# Patient Record
Sex: MALE | Race: WHITE | NOT HISPANIC OR LATINO
[De-identification: names, ages, dates, MRNs, and addresses within clinical notes are randomized per-mention and may not be internally consistent; named-entity substitution may affect disease eponyms.]

---

## 2020-07-22 ENCOUNTER — TRANSCRIPTION ENCOUNTER (OUTPATIENT)
Age: 30
End: 2020-07-22

## 2020-08-17 ENCOUNTER — OUTPATIENT (OUTPATIENT)
Dept: OUTPATIENT SERVICES | Facility: HOSPITAL | Age: 30
LOS: 1 days | Discharge: HOME | End: 2020-08-17

## 2020-08-17 VITALS
DIASTOLIC BLOOD PRESSURE: 64 MMHG | RESPIRATION RATE: 16 BRPM | SYSTOLIC BLOOD PRESSURE: 111 MMHG | TEMPERATURE: 96 F | OXYGEN SATURATION: 99 % | HEIGHT: 71 IN | WEIGHT: 145.51 LBS | HEART RATE: 47 BPM

## 2020-08-17 DIAGNOSIS — Z11.59 ENCOUNTER FOR SCREENING FOR OTHER VIRAL DISEASES: ICD-10-CM

## 2020-08-17 DIAGNOSIS — S63.641D SPRAIN OF METACARPOPHALANGEAL JOINT OF RIGHT THUMB, SUBSEQUENT ENCOUNTER: ICD-10-CM

## 2020-08-17 DIAGNOSIS — S63.641A SPRAIN OF METACARPOPHALANGEAL JOINT OF RIGHT THUMB, INITIAL ENCOUNTER: ICD-10-CM

## 2020-08-17 DIAGNOSIS — Z01.818 ENCOUNTER FOR OTHER PREPROCEDURAL EXAMINATION: ICD-10-CM

## 2020-08-17 LAB
ALBUMIN SERPL ELPH-MCNC: 4.9 G/DL — SIGNIFICANT CHANGE UP (ref 3.5–5.2)
ALP SERPL-CCNC: 41 U/L — SIGNIFICANT CHANGE UP (ref 30–115)
ALT FLD-CCNC: 22 U/L — SIGNIFICANT CHANGE UP (ref 0–41)
ANION GAP SERPL CALC-SCNC: 11 MMOL/L — SIGNIFICANT CHANGE UP (ref 7–14)
APTT BLD: 29.3 SEC — SIGNIFICANT CHANGE UP (ref 27–39.2)
AST SERPL-CCNC: 25 U/L — SIGNIFICANT CHANGE UP (ref 0–41)
BASOPHILS # BLD AUTO: 0.04 K/UL — SIGNIFICANT CHANGE UP (ref 0–0.2)
BASOPHILS NFR BLD AUTO: 0.6 % — SIGNIFICANT CHANGE UP (ref 0–1)
BILIRUB SERPL-MCNC: 0.6 MG/DL — SIGNIFICANT CHANGE UP (ref 0.2–1.2)
BUN SERPL-MCNC: 18 MG/DL — SIGNIFICANT CHANGE UP (ref 10–20)
CALCIUM SERPL-MCNC: 9.9 MG/DL — SIGNIFICANT CHANGE UP (ref 8.5–10.1)
CHLORIDE SERPL-SCNC: 100 MMOL/L — SIGNIFICANT CHANGE UP (ref 98–110)
CO2 SERPL-SCNC: 30 MMOL/L — SIGNIFICANT CHANGE UP (ref 17–32)
CREAT SERPL-MCNC: 1 MG/DL — SIGNIFICANT CHANGE UP (ref 0.7–1.5)
EOSINOPHIL # BLD AUTO: 0.02 K/UL — SIGNIFICANT CHANGE UP (ref 0–0.7)
EOSINOPHIL NFR BLD AUTO: 0.3 % — SIGNIFICANT CHANGE UP (ref 0–8)
GLUCOSE SERPL-MCNC: 115 MG/DL — HIGH (ref 70–99)
HCT VFR BLD CALC: 41.4 % — LOW (ref 42–52)
HGB BLD-MCNC: 13.7 G/DL — LOW (ref 14–18)
IMM GRANULOCYTES NFR BLD AUTO: 0.2 % — SIGNIFICANT CHANGE UP (ref 0.1–0.3)
INR BLD: 0.96 RATIO — SIGNIFICANT CHANGE UP (ref 0.65–1.3)
LYMPHOCYTES # BLD AUTO: 2.72 K/UL — SIGNIFICANT CHANGE UP (ref 1.2–3.4)
LYMPHOCYTES # BLD AUTO: 42.7 % — SIGNIFICANT CHANGE UP (ref 20.5–51.1)
MCHC RBC-ENTMCNC: 30 PG — SIGNIFICANT CHANGE UP (ref 27–31)
MCHC RBC-ENTMCNC: 33.1 G/DL — SIGNIFICANT CHANGE UP (ref 32–37)
MCV RBC AUTO: 90.6 FL — SIGNIFICANT CHANGE UP (ref 80–94)
MONOCYTES # BLD AUTO: 0.71 K/UL — HIGH (ref 0.1–0.6)
MONOCYTES NFR BLD AUTO: 11.1 % — HIGH (ref 1.7–9.3)
NEUTROPHILS # BLD AUTO: 2.87 K/UL — SIGNIFICANT CHANGE UP (ref 1.4–6.5)
NEUTROPHILS NFR BLD AUTO: 45.1 % — SIGNIFICANT CHANGE UP (ref 42.2–75.2)
NRBC # BLD: 0 /100 WBCS — SIGNIFICANT CHANGE UP (ref 0–0)
PLATELET # BLD AUTO: 205 K/UL — SIGNIFICANT CHANGE UP (ref 130–400)
POTASSIUM SERPL-MCNC: 4.5 MMOL/L — SIGNIFICANT CHANGE UP (ref 3.5–5)
POTASSIUM SERPL-SCNC: 4.5 MMOL/L — SIGNIFICANT CHANGE UP (ref 3.5–5)
PROT SERPL-MCNC: 6.9 G/DL — SIGNIFICANT CHANGE UP (ref 6–8)
PROTHROM AB SERPL-ACNC: 11 SEC — SIGNIFICANT CHANGE UP (ref 9.95–12.87)
RBC # BLD: 4.57 M/UL — LOW (ref 4.7–6.1)
RBC # FLD: 12.3 % — SIGNIFICANT CHANGE UP (ref 11.5–14.5)
SODIUM SERPL-SCNC: 141 MMOL/L — SIGNIFICANT CHANGE UP (ref 135–146)
WBC # BLD: 6.37 K/UL — SIGNIFICANT CHANGE UP (ref 4.8–10.8)
WBC # FLD AUTO: 6.37 K/UL — SIGNIFICANT CHANGE UP (ref 4.8–10.8)

## 2020-08-17 NOTE — H&P PST ADULT - HISTORY OF PRESENT ILLNESS
Pt states he dislocated right thumb and it causes pain whenever he uses his right hand. Denies any chest pain, difficulty breathing, SOB, palpitations, dysuria, URI, or any other infections in the last 2 weeks. Denies any recent travel, contact, or exposure to any persons with known or suspected COVID-19. Pt also denies COVID testing within the last 2 weeks. Pt advised to self quarantine until day of procedure. Exercise tolerance of 2-3 flights of stairs without dyspnea. STORM reviewed with patient.     Anesthesia Alert  NO--Difficult Airway  NO--History of neck surgery or radiation  NO--Limited ROM of neck  NO--History of Malignant hyperthermia  NO--No personal or family history of Pseudocholinesterase deficiency.  NO--Prior Anesthesia Complication  NO--Latex Allergy  NO--Loose teeth  NO--History of Rheumatoid Arthritis  NO--STORM  NO--Other_____

## 2020-08-17 NOTE — H&P PST ADULT - NSANTHOSAYNRD_GEN_A_CORE
No. STORM screening performed.  STOP BANG Legend: 0-2 = LOW Risk; 3-4 = INTERMEDIATE Risk; 5-8 = HIGH Risk

## 2020-08-17 NOTE — H&P PST ADULT - EXTREMITIES COMMENTS
Brace on right upper extremity. Right thumb limited ROM and decrease strength, capillary refill less then 3 seconds

## 2020-08-20 ENCOUNTER — OUTPATIENT (OUTPATIENT)
Dept: OUTPATIENT SERVICES | Facility: HOSPITAL | Age: 30
LOS: 1 days | Discharge: HOME | End: 2020-08-20

## 2020-08-20 VITALS
DIASTOLIC BLOOD PRESSURE: 82 MMHG | SYSTOLIC BLOOD PRESSURE: 131 MMHG | HEART RATE: 55 BPM | OXYGEN SATURATION: 100 % | RESPIRATION RATE: 22 BRPM

## 2020-08-20 VITALS
OXYGEN SATURATION: 100 % | DIASTOLIC BLOOD PRESSURE: 87 MMHG | HEIGHT: 71 IN | HEART RATE: 52 BPM | RESPIRATION RATE: 18 BRPM | SYSTOLIC BLOOD PRESSURE: 125 MMHG | WEIGHT: 145.51 LBS | TEMPERATURE: 99 F

## 2020-08-20 RX ORDER — ONDANSETRON 8 MG/1
4 TABLET, FILM COATED ORAL ONCE
Refills: 0 | Status: DISCONTINUED | OUTPATIENT
Start: 2020-08-20 | End: 2020-09-04

## 2020-08-20 RX ORDER — OXYCODONE HYDROCHLORIDE 5 MG/1
5 TABLET ORAL ONCE
Refills: 0 | Status: DISCONTINUED | OUTPATIENT
Start: 2020-08-20 | End: 2020-08-20

## 2020-08-20 RX ORDER — HYDROMORPHONE HYDROCHLORIDE 2 MG/ML
0.5 INJECTION INTRAMUSCULAR; INTRAVENOUS; SUBCUTANEOUS
Refills: 0 | Status: DISCONTINUED | OUTPATIENT
Start: 2020-08-20 | End: 2020-08-20

## 2020-08-20 RX ORDER — SODIUM CHLORIDE 9 MG/ML
1000 INJECTION, SOLUTION INTRAVENOUS
Refills: 0 | Status: DISCONTINUED | OUTPATIENT
Start: 2020-08-20 | End: 2020-09-04

## 2020-08-20 RX ADMIN — SODIUM CHLORIDE 100 MILLILITER(S): 9 INJECTION, SOLUTION INTRAVENOUS at 08:39

## 2020-08-20 NOTE — CHART NOTE - NSCHARTNOTEFT_GEN_A_CORE
PACU ANESTHESIA ADMISSION NOTE      Procedure: Repair, ligament, thumb: right thumb rcl mpj    Post op diagnosis:  Rupture of radial collateral ligament of right thumb      ____  Intubated  TV:______       Rate: ______      FiO2: ______    _x___  Patent Airway    _x___  Full return of protective reflexes    _x___  Full recovery from anesthesia / back to baseline status    Vitals:  T(C): 37.1  HR: 52  BP: 125/87  RR: 18  SpO2: 100%    Mental Status:  _x___ Awake   _____ Alert   _____ Drowsy   _____ Sedated    Nausea/Vomiting:  _x___  NO       ______Yes,   See Post - Op Orders         Pain Scale (0-10):  __0___    Treatment: _x___ None    ____ See Post - Op/PCA Orders    Post - Operative Fluids:   ____ Oral   __x__ See Post - Op Orders    Plan: Discharge:   _x___Home       _____Floor     _____Critical Care    _____  Other:_________________    Comments:  No anesthesia issues or complications noted.  Discharge when criteria met.

## 2020-08-20 NOTE — BRIEF OPERATIVE NOTE - NSICDXBRIEFPOSTOP_GEN_ALL_CORE_FT
POST-OP DIAGNOSIS:  Rupture of radial collateral ligament of right thumb 20-Aug-2020 08:39:57  Reina Ruiz

## 2020-08-20 NOTE — BRIEF OPERATIVE NOTE - NSICDXBRIEFPREOP_GEN_ALL_CORE_FT
PRE-OP DIAGNOSIS:  Rupture of radial collateral ligament of right thumb 20-Aug-2020 08:39:53  Reina Ruiz

## 2020-08-25 DIAGNOSIS — S63.601A UNSPECIFIED SPRAIN OF RIGHT THUMB, INITIAL ENCOUNTER: ICD-10-CM

## 2020-08-25 DIAGNOSIS — F17.220 NICOTINE DEPENDENCE, CHEWING TOBACCO, UNCOMPLICATED: ICD-10-CM
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
## 2020-08-26 DIAGNOSIS — Z20.828 CONTACT WITH AND (SUSPECTED) EXPOSURE TO OTHER VIRAL COMMUNICABLE DISEASES: ICD-10-CM

## 2021-10-11 ENCOUNTER — OUTPATIENT (OUTPATIENT)
Dept: OUTPATIENT SERVICES | Facility: HOSPITAL | Age: 31
LOS: 1 days | Discharge: HOME | End: 2021-10-11

## 2021-10-11 ENCOUNTER — LABORATORY RESULT (OUTPATIENT)
Age: 31
End: 2021-10-11

## 2021-10-11 DIAGNOSIS — Z11.59 ENCOUNTER FOR SCREENING FOR OTHER VIRAL DISEASES: ICD-10-CM

## 2021-10-11 PROBLEM — Z78.9 OTHER SPECIFIED HEALTH STATUS: Chronic | Status: ACTIVE | Noted: 2020-08-17

## 2021-10-14 PROBLEM — Z00.00 ENCOUNTER FOR PREVENTIVE HEALTH EXAMINATION: Status: ACTIVE | Noted: 2021-10-14

## 2021-12-30 NOTE — ASU PATIENT PROFILE, ADULT - PSH
children: Not on file    Years of education: Not on file    Highest education level: Not on file   Occupational History    Not on file   Tobacco Use    Smoking status: Passive Smoke Exposure - Never Smoker    Smokeless tobacco: Never Used   Substance and Sexual Activity    Alcohol use: No    Drug use: No    Sexual activity: Not Currently   Other Topics Concern    Not on file   Social History Narrative    Not on file     Social Determinants of Health     Financial Resource Strain:     Difficulty of Paying Living Expenses: Not on file   Food Insecurity:     Worried About Running Out of Food in the Last Year: Not on file    Major of Food in the Last Year: Not on file   Transportation Needs:     Lack of Transportation (Medical): Not on file    Lack of Transportation (Non-Medical): Not on file   Physical Activity:     Days of Exercise per Week: Not on file    Minutes of Exercise per Session: Not on file   Stress:     Feeling of Stress : Not on file   Social Connections:     Frequency of Communication with Friends and Family: Not on file    Frequency of Social Gatherings with Friends and Family: Not on file    Attends [employer] Services: Not on file    Active Member of [address] [place] [address] or Organizations: Not on file    Attends Club or Organization Meetings: Not on file    Marital Status: Not on file   Intimate Partner Violence:     Fear of Current or Ex-Partner: Not on file    Emotionally Abused: Not on file    Physically Abused: Not on file    Sexually Abused: Not on file   Housing Stability:     Unable to Pay for Housing in the Last Year: Not on file    Number of [place] in the Last Year: Not on file    Unstable Housing in the Last Year: Not on file       SCREENINGS           PHYSICAL EXAM    (up to 7 for level 4, 8 or more for level 5)     ED Triage Vitals   BP Temp Temp src Pulse Resp SpO2 Height Weight   -- -- -- -- -- -- -- --       Physical Exam    General: Alert and awake ×3.   Nontoxic appearance. Well-developed well-nourished 82-year-old male in no acute distress  HEENT: Normocephalic atraumatic. Neck is supple. Airway intact. No adenopathy  Cardiac: Regular rate and rhythm with no murmurs rubs or gallops  Pulmonary: Lungs are clear in all lung fields. No wheezing. No Rales. Abdomen: Soft and nontender. Negative hepatosplenomegaly. Bowel sounds are active  Extremities: Moving all extremities. No calf tenderness. Peripheral pulses all intact  Skin: No skin lesions. No rashes  Neurologic: Cranial nerves II through XII was grossly intact. Nonfocal neurological exam  Psychiatric: Patient is pleasant. Mood is appropriate. DIAGNOSTIC RESULTS     EKG (Per Emergency Physician):       RADIOLOGY (Per Emergency Physician): Interpretation per the Radiologist below, if available at the time of this note:  No results found. ED BEDSIDE ULTRASOUND:   Performed by ED Physician - none    LABS:  Labs Reviewed   [id number]   COVID-19   COVID-19   COVID-19        All other labs were within normal range or not returned as of this dictation. Procedures      EMERGENCY DEPARTMENT COURSE and DIFFERENTIAL DIAGNOSIS/MDM:   Vitals:    Vitals:    12/29/21 2219   BP: (!) 145/89   Pulse: 72   Resp: 16   Temp: 98.1 °F (36.7 °C)   TempSrc: Oral   SpO2: 98%   Weight: 256 lb 6.3 oz (116.3 kg)   Height: 5' 9\" (1.753 m)       Medications - No data to display    MDM. Patient 82-year-old with exposure to Covid for couple days now developing symptoms. Covid test obtained PCR pending. Patient discharged supportive care only follow-up. Isolation precautions taken. REVAL:         CRITICAL CARE TIME   Total CriticalCare time was 0 minutes, excluding separately reportable procedures. There was a high probability of clinically significant/life threatening deterioration in the patient's condition which required my urgent intervention.      CONSULTS:  None    PROCEDURES:  Unless otherwise noted below, none     [unfilled]    FINAL IMPRESSION      1. COVID-19 virus infection          DISPOSITION/PLAN   DISPOSITION Decision To Discharge 12/29/2021 10:42:00 PM      PATIENT REFERRED TO:  Family physician    Schedule an appointment as soon as possible for a visit in 1 week  If symptoms worsen      DISCHARGE MEDICATIONS:  New Prescriptions    No medications on file          (Please note:  Portions of this note were completed with a voice recognition program.Efforts were made to edit the dictations but occasionally words and phrases are mis-transcribed.)  Form v2016. [id number]    Elroy CALZADA MD (electronically signed)  Emergency Medicine Provider        April Nyhan, MD  12/29/21 0026 No significant past surgical history

## 2023-12-09 ENCOUNTER — EMERGENCY (EMERGENCY)
Facility: HOSPITAL | Age: 33
LOS: 0 days | Discharge: HOME | End: 2023-12-09
Attending: EMERGENCY MEDICINE
Payer: COMMERCIAL

## 2023-12-09 VITALS
TEMPERATURE: 98 F | WEIGHT: 149.91 LBS | RESPIRATION RATE: 18 BRPM | SYSTOLIC BLOOD PRESSURE: 141 MMHG | OXYGEN SATURATION: 95 % | HEIGHT: 71 IN | DIASTOLIC BLOOD PRESSURE: 84 MMHG | HEART RATE: 65 BPM

## 2023-12-09 DIAGNOSIS — S43.005A UNSPECIFIED DISLOCATION OF LEFT SHOULDER JOINT, INITIAL ENCOUNTER: ICD-10-CM

## 2023-12-09 DIAGNOSIS — M25.512 PAIN IN LEFT SHOULDER: ICD-10-CM
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
## 2023-12-09 PROCEDURE — 99284 EMERGENCY DEPT VISIT MOD MDM: CPT | Mod: 57

## 2023-12-09 PROCEDURE — 73030 X-RAY EXAM OF SHOULDER: CPT | Mod: LT

## 2023-12-09 PROCEDURE — 99283 EMERGENCY DEPT VISIT LOW MDM: CPT | Mod: 25

## 2023-12-09 PROCEDURE — 23650 CLTX SHO DSLC W/MNPJ WO ANES: CPT | Mod: 54,LT

## 2023-12-09 PROCEDURE — 73030 X-RAY EXAM OF SHOULDER: CPT | Mod: 26,LT

## 2023-12-09 PROCEDURE — 23650 CLTX SHO DSLC W/MNPJ WO ANES: CPT | Mod: LT

## 2023-12-09 NOTE — ED PROVIDER NOTE - NSFOLLOWUPINSTRUCTIONS_ED_ALL_ED_FT
Our Emergency Department Referral Coordinators will be reaching out to you in the next 24-48 hours from 9:00am to 5:00pm with a follow up appointment. Please expect a phone call from the hospital in that time frame. If you do not receive a call or if you have any questions or concerns, you can reach them at   [phone]   ---------------------------------      Closed Reduction    WHAT YOU NEED TO KNOW:    Closed reduction is a procedure to put the pieces of a broken bone back into the right place without surgery. Closed reduction is used when your bone is broken in one place and the bone pieces have not gone through the skin. It is also used when you do not need hardware such as pins, screws, or plates to hold the pieces of bone in place. It is best if closed reduction can be done as soon as possible after your bone is broken.    DISCHARGE INSTRUCTIONS:    Call your local emergency number (911 in the ) if:    You feel lightheaded, short of breath, and have chest pain.    You cough up blood.  Call your doctor if:    Your arm or leg feels warm, tender, and painful. It may look swollen and red.    You have severe pain, even after you take medicine.    Your injured limb is red or swollen.    You have numbness in your fingers or toes.    You have a fever or chills.    There is a foul smell coming from inside your splint or cast.    Your splint or cast gets damaged, wet, or cracks.    Your splint or cast feels too tight or too loose.    You have questions or concerns about your condition or care.  Medicines: You may need any of the following:    Acetaminophen decreases pain and fever. It is available without a doctor's order. Ask how much to take and how often to take it. Follow directions. Read the labels of all other medicines you are using to see if they also contain acetaminophen, or ask your doctor or pharmacist. Acetaminophen can cause liver damage if not taken correctly.    NSAIDs, such as ibuprofen, help decrease swelling, pain, and fever. This medicine is available with or without a doctor's order. NSAIDs can cause stomach bleeding or kidney problems in certain people. If you take blood thinner medicine, always ask if NSAIDs are safe for you. Always read the medicine label and follow directions. Do not give these medicines to children younger than 6 months without direction from a healthcare provider.    Prescription pain medicine may be given. Ask your healthcare provider how to take this medicine safely. Some prescription pain medicines contain acetaminophen. Do not take other medicines that contain acetaminophen without talking to your healthcare provider. Too much acetaminophen may cause liver damage. Prescription pain medicine may cause constipation. Ask your healthcare provider how to prevent or treat constipation.    Take your medicine as directed. Contact your healthcare provider if you think your medicine is not helping or if you have side effects. Tell your provider if you are allergic to any medicine. Keep a list of the medicines, vitamins, and herbs you take. Include the amounts, and when and why you take them. Bring the list or the pill bottles to follow-up visits. Carry your medicine list with you in case of an emergency.  Care for yourself at home:    Use crutches or a walker as directed if you broke a bone in your leg or foot. These devices will help you walk and take some weight off your injured leg or foot.    Rest your limb as much as possible. Ask your healthcare provider when you can return to daily activities. You will need to limit activities or exercise while your bone heals.    Elevate your injured area above the level of your heart as often as you can. This will help decrease swelling and pain. Prop your cast or splint on pillows or blankets to keep it elevated comfortably.    Apply ice on your broken bone for 15 to 20 minutes every hour or as directed. Use an ice pack, or put crushed ice in a plastic bag. Cover it with a towel. Ice helps prevent tissue damage and decreases swelling and pain.    Do not take baths, soak in a hot tub, or go swimming until your provider says it is okay.    Go to physical therapy if directed. Physical therapy usually starts after your bones have healed and your splint or cast is removed. A physical therapist teaches you exercises to help improve movement and strength.    Do not wear rings on your fingers if the break was in an arm or hand. Don't wear rings on your toes if the break was in the leg or foot.  Cast and splint care:    Check your cast every day. Call your healthcare provider if you notice any cracks, dents, holes, or flaking on your cast.    Keep your splint or cast clean and dry. Cover your splint or cast with a towel when you eat. If your splint or cast gets dirty, use a mild detergent and a damp washcloth to wipe off the outside. Continue to cover your splint or cast with trash bags to keep it dry while you bathe.    Care for the edges of your cast. Cover the cast edges to keep them smooth. Use 4 inch pieces of waterproof tape. Place one end of the tape under the inside edge of your cast and fold it over to the outside surface. Overlap tape strips until the edges are completely covered. Change the tape as directed. Do not pull or repair any of the padding from inside the cast. This could cause blisters and sores on the skin under your cast.    Keep weight off your splint or cast. Do not let anyone push down or lean on your splint or cast. This may cause it to break.    Do not put sharp objects in the splint or cast. Do not use a sharp or pointed object to scratch under your splint or cast. This may cause wounds that can get infected, or you may lose the item inside. If your skin itches, blow cool air into the splint or cast. You may also gently scratch your skin outside with a cloth.  Follow up with your healthcare provider as directed: You may need to return for x-rays or to have your splint changed to a cast. Write down your questions so you remember to ask them during your visits. Our Emergency Department Referral Coordinators will be reaching out to you in the next 24-48 hours from 9:00am to 5:00pm with a follow up appointment. Please expect a phone call from the hospital in that time frame. If you do not receive a call or if you have any questions or concerns, you can reach them at   [phone]   ---------------------------------      Closed Reduction    WHAT YOU NEED TO KNOW:    Closed reduction is a procedure to put the pieces of a broken bone back into the right place without surgery. Closed reduction is used when your bone is broken in one place and the bone pieces have not gone through the skin. It is also used when you do not need hardware such as pins, screws, or plates to hold the pieces of bone in place. It is best if closed reduction can be done as soon as possible after your bone is broken.    DISCHARGE INSTRUCTIONS:    Call your local emergency number (911 in the ) if:    You feel lightheaded, short of breath, and have chest pain.    You cough up blood.  Call your doctor if:    Your arm or leg feels warm, tender, and painful. It may look swollen and red.    You have severe pain, even after you take medicine.    Your injured limb is red or swollen.    You have numbness in your fingers or toes.    You have a fever or chills.    There is a foul smell coming from inside your splint or cast.    Your splint or cast gets damaged, wet, or cracks.    Your splint or cast feels too tight or too loose.    You have questions or concerns about your condition or care.  Medicines: You may need any of the following:    Acetaminophen decreases pain and fever. It is available without a doctor's order. Ask how much to take and how often to take it. Follow directions. Read the labels of all other medicines you are using to see if they also contain acetaminophen, or ask your doctor or pharmacist. Acetaminophen can cause liver damage if not taken correctly.    NSAIDs, such as ibuprofen, help decrease swelling, pain, and fever. This medicine is available with or without a doctor's order. NSAIDs can cause stomach bleeding or kidney problems in certain people. If you take blood thinner medicine, always ask if NSAIDs are safe for you. Always read the medicine label and follow directions. Do not give these medicines to children younger than 6 months without direction from a healthcare provider.    Prescription pain medicine may be given. Ask your healthcare provider how to take this medicine safely. Some prescription pain medicines contain acetaminophen. Do not take other medicines that contain acetaminophen without talking to your healthcare provider. Too much acetaminophen may cause liver damage. Prescription pain medicine may cause constipation. Ask your healthcare provider how to prevent or treat constipation.    Take your medicine as directed. Contact your healthcare provider if you think your medicine is not helping or if you have side effects. Tell your provider if you are allergic to any medicine. Keep a list of the medicines, vitamins, and herbs you take. Include the amounts, and when and why you take them. Bring the list or the pill bottles to follow-up visits. Carry your medicine list with you in case of an emergency.  Care for yourself at home:    Use crutches or a walker as directed if you broke a bone in your leg or foot. These devices will help you walk and take some weight off your injured leg or foot.    Rest your limb as much as possible. Ask your healthcare provider when you can return to daily activities. You will need to limit activities or exercise while your bone heals.    Elevate your injured area above the level of your heart as often as you can. This will help decrease swelling and pain. Prop your cast or splint on pillows or blankets to keep it elevated comfortably.    Apply ice on your broken bone for 15 to 20 minutes every hour or as directed. Use an ice pack, or put crushed ice in a plastic bag. Cover it with a towel. Ice helps prevent tissue damage and decreases swelling and pain.    Do not take baths, soak in a hot tub, or go swimming until your provider says it is okay.    Go to physical therapy if directed. Physical therapy usually starts after your bones have healed and your splint or cast is removed. A physical therapist teaches you exercises to help improve movement and strength.    Do not wear rings on your fingers if the break was in an arm or hand. Don't wear rings on your toes if the break was in the leg or foot.  Cast and splint care:    Check your cast every day. Call your healthcare provider if you notice any cracks, dents, holes, or flaking on your cast.    Keep your splint or cast clean and dry. Cover your splint or cast with a towel when you eat. If your splint or cast gets dirty, use a mild detergent and a damp washcloth to wipe off the outside. Continue to cover your splint or cast with trash bags to keep it dry while you bathe.    Care for the edges of your cast. Cover the cast edges to keep them smooth. Use 4 inch pieces of waterproof tape. Place one end of the tape under the inside edge of your cast and fold it over to the outside surface. Overlap tape strips until the edges are completely covered. Change the tape as directed. Do not pull or repair any of the padding from inside the cast. This could cause blisters and sores on the skin under your cast.    Keep weight off your splint or cast. Do not let anyone push down or lean on your splint or cast. This may cause it to break.    Do not put sharp objects in the splint or cast. Do not use a sharp or pointed object to scratch under your splint or cast. This may cause wounds that can get infected, or you may lose the item inside. If your skin itches, blow cool air into the splint or cast. You may also gently scratch your skin outside with a cloth.  Follow up with your healthcare provider as directed: You may need to return for x-rays or to have your splint changed to a cast. Write down your questions so you remember to ask them during your visits.

## 2023-12-09 NOTE — ED PROVIDER NOTE - CLINICAL SUMMARY MEDICAL DECISION MAKING FREE TEXT BOX
33-year-old male past medical history of left shoulder dislocation who presents with a symptomatic left shoulder dislocation.  Patient was playing basketball and was blocking a shot when he dislocated his shoulder.  No direct trauma to the area.  Patient reports mild tingling to his left fifth finger.  Patient denies any other pain or injuries and symptoms identical to prior shoulder dislocation.  On exam, vital signs reviewed.  Patient is deformity to left shoulder consistent with shoulder dislocation.  Axillary nerve tested and intact.  Patient has good pulses.  Patient has subjectively decreased sensation to the left pinky and lateral hand.  Intra-articular lidocaine block performed and patient had successful dislocation reduction of his shoulder.  Sling applied.  Shoulder dislocation discharge instructions discussed and patient aware he may require an MRI upon follow-up with the orthopedist.    Full DC instructions discussed and patient knows when to seek immediate medical attention.  Patient has proper follow up.  All results discussed and patient aware they may require further work up.  Proper follow up ensured. Limitations of ED work up discussed.  Medications administered and prescribed/OTC home meds discussed.  All questions and concerns from patient or family addressed. Understanding of instructions verbalized.

## 2023-12-09 NOTE — ED PROVIDER NOTE - PATIENT PORTAL LINK FT
You can access the FollowMyHealth Patient Portal offered by [facility] by registering at the following website: [email or web address]. By joining [facility]’s FollowMyHealth portal, you will also be able to view your health information using other applications (apps) compatible with our system. You can access the FollowMyHealth Patient Portal offered by [facility] by registering at the following website: [email or web address]. By joining [facility]’s FollowMyHealth portal, you will also be able to view your health information using other applications (apps) compatible with our system.

## 2023-12-09 NOTE — ED PROCEDURE NOTE - CPROC ED POST RADIOGRAPHY1
post-procedure radiography performed left upper quadrant/left lower quadrant/right lower quadrant/epigastric/right upper quadrant

## 2023-12-09 NOTE — ED PROVIDER NOTE - PHYSICAL EXAMINATION
VITAL SIGNS: I have reviewed nursing notes and confirm.  CONSTITUTIONAL: Well-developed; well-nourished; in no acute distress.  HEAD: Normocephalic; atraumatic.  RESP: Normal respiratory effort, no tachypnea or distress.   EXT: L shoulder asymmetric compared to R, humeral head palpable inferior to joint c/w dislocation. Pt has decreased sensation to L pinky but able to move all fingers, 2+RP. No bruising or overlying skin changes. No clavicular ttp or step off, no humeral ttp.   back with no c/t/l/s midline spinal or paraspinal tto  NEURO: Alert, oriented. Grossly unremarkable. No focal deficits.  PSYCH: Cooperative, appropriate.

## 2023-12-09 NOTE — ED ADULT NURSE NOTE - NSFALLUNIVINTERV_ED_ALL_ED
Bed/Stretcher in lowest position, wheels locked, appropriate side rails in place/Call bell, personal items and telephone in reach/Instruct patient to call for assistance before getting out of bed/chair/stretcher/Non-slip footwear applied when patient is off stretcher/[place] to call system/Physically safe environment - no spills, clutter or unnecessary equipment/Purposeful proactive rounding/Room/bathroom lighting operational, light cord in reach Bed/Stretcher in lowest position, wheels locked, appropriate side rails in place/Call bell, personal items and telephone in reach/Instruct patient to call for assistance before getting out of bed/chair/stretcher/Non-slip footwear applied when patient is off stretcher/[place] to call system/Physically safe environment - no spills, clutter or unnecessary equipment/Purposeful proactive rounding/Room/bathroom lighting operational, light cord in reach

## 2023-12-09 NOTE — ED PROVIDER NOTE - PROGRESS NOTE DETAILS
MADINA: L pinky sensation back to baseline/normal following reduction. sensation intact and equal on exam

## 2023-12-09 NOTE — ED ADULT TRIAGE NOTE - NSWEIGHTCALCTOOLDRUG_GEN_A_CORE
----- Message from Meme Mcdonald MA sent at 10/10/2022  9:36 AM CDT -----  Contact: pt  Wants to order Wheelchair   Call back        used

## 2023-12-09 NOTE — ED PROVIDER NOTE - ATTENDING CONTRIBUTION TO CARE
I personally evaluated patient. I agree with the findings and plan with all documentation on chart except as documented  in my note.    33-year-old male past medical history of left shoulder dislocation who presents with a symptomatic left shoulder dislocation.  Patient was playing basketball and was blocking a shot when he dislocated his shoulder.  No direct trauma to the area.  Patient reports mild tingling to his left fifth finger.  Patient denies any other pain or injuries and symptoms identical to prior shoulder dislocation.  On exam, vital signs reviewed.  Patient is deformity to left shoulder consistent with shoulder dislocation.  Axillary nerve tested and intact.  Patient has good pulses.  Patient has subjectively decreased sensation to the left pinky and lateral hand.  Intra-articular lidocaine block performed and patient had successful dislocation reduction of his shoulder.  Sling applied.  Shoulder dislocation discharge instructions discussed and patient aware he may require an MRI upon follow-up with the orthopedist.    Full DC instructions discussed and patient knows when to seek immediate medical attention.  Patient has proper follow up.  All results discussed and patient aware they may require further work up.  Proper follow up ensured. Limitations of ED work up discussed.  Medications administered and prescribed/OTC home meds discussed.  All questions and concerns from patient or family addressed. Understanding of instructions verbalized.

## 2023-12-09 NOTE — ED ADULT TRIAGE NOTE - CHIEF COMPLAINT QUOTE
Pt reports of L dislocated shoulder today while playing basketball. Pt notes h/o recurring shoulder dislocation.

## 2023-12-09 NOTE — ED PROVIDER NOTE - OBJECTIVE STATEMENT
33-year-old male with no PMH, history of left shoulder dislocation presents ED for evaluation of left shoulder pain status post injury while playing basketball 30 minutes ago.  States he went to block another player from shooting, the ball hit into his left hand and he immediately felt pain to his left shoulder.  States he feels like it is dislocated.  Denies fall or head trauma.  Has no other complaints of pain or injury.  Denies CP, SOB, back pain, abdominal pain.

## 2024-01-03 ENCOUNTER — APPOINTMENT (OUTPATIENT)
Dept: ORTHOPEDIC SURGERY | Facility: CLINIC | Age: 34
End: 2024-01-03

## 2024-08-11 ENCOUNTER — EMERGENCY (EMERGENCY)
Facility: HOSPITAL | Age: 34
LOS: 0 days | Discharge: HOME | End: 2024-08-11
Attending: EMERGENCY MEDICINE
Payer: COMMERCIAL

## 2024-08-11 VITALS
HEART RATE: 75 BPM | RESPIRATION RATE: 18 BRPM | WEIGHT: 164.91 LBS | SYSTOLIC BLOOD PRESSURE: 135 MMHG | OXYGEN SATURATION: 98 % | DIASTOLIC BLOOD PRESSURE: 76 MMHG | TEMPERATURE: 98 F | HEIGHT: 71 IN
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
## 2024-08-11 DIAGNOSIS — S43.015A ANTERIOR DISLOCATION OF LEFT HUMERUS, INITIAL ENCOUNTER: ICD-10-CM

## 2024-08-11 DIAGNOSIS — M25.512 PAIN IN LEFT SHOULDER: ICD-10-CM
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
## 2024-08-11 PROCEDURE — 73030 X-RAY EXAM OF SHOULDER: CPT | Mod: 26,LT

## 2024-08-11 PROCEDURE — 23650 CLTX SHO DSLC W/MNPJ WO ANES: CPT | Mod: 54,LT

## 2024-08-11 PROCEDURE — 23650 CLTX SHO DSLC W/MNPJ WO ANES: CPT | Mod: LT

## 2024-08-11 PROCEDURE — 73030 X-RAY EXAM OF SHOULDER: CPT | Mod: LT

## 2024-08-11 PROCEDURE — 73030 X-RAY EXAM OF SHOULDER: CPT | Mod: 26,LT,77

## 2024-08-11 PROCEDURE — 99284 EMERGENCY DEPT VISIT MOD MDM: CPT | Mod: 57

## 2024-08-11 PROCEDURE — 99284 EMERGENCY DEPT VISIT MOD MDM: CPT | Mod: 25

## 2024-08-11 NOTE — ED PROVIDER NOTE - OBJECTIVE STATEMENT
Patient is a 34 year old male with no pmhx presents for evaluation after falling onto left shoulder causing dislocation. he reports history of 1 dislocation. He denies any other trauma, injuries, or complaints.

## 2024-08-11 NOTE — ED PROVIDER NOTE - ADDITIONAL NOTES AND INSTRUCTIONS:
Mr Medina was seen in the Emergency Department on 8/11/24 and can return to school or work by the listed date with activity as tolerated.

## 2024-08-11 NOTE — ED PROVIDER NOTE - NSFOLLOWUPINSTRUCTIONS_ED_ALL_ED_FT
FOLLOW UP WITH AN ORTHOPEDIC DOCTOR    Shoulder Dislocation    WHAT YOU NEED TO KNOW:    What is a shoulder dislocation? A shoulder dislocation happens when the top of your arm bone (humerus) moves out of the socket in your shoulder blade. Shoulder dislocation can happen at any age but is not common in younger children.    What are the signs and symptoms of a shoulder dislocation?   •Shoulder and arm pain that worsens with movement  •A bump in front of or behind your shoulder  •Different shapes for each shoulder  •Redness and swelling in your shoulder  •Muscle spasms in your shoulder  •Weakness, numbness, or tingling in your shoulder and arm    Shoulder Sling  •Physical therapy may be used to teach you exercises to help improve movement and strength, and to decrease pain.  •Surgery may be needed to repair damaged tissues around your shoulder joint. You may also need a bone graft to repair your shoulder. A bone graft is artificial bone used to replace your damaged bone. You may also need surgery if your shoulder dislocates often.    What can I do to care for my shoulder?   •Apply ice to your shoulder. Ice helps decrease swelling and pain. Ice may also help prevent tissue damage. Use an ice pack, or put crushed ice in a plastic bag. Cover it with a towel before you place it on your shoulder. Apply ice for 15 to 20 minutes every hour or as directed.  •Rest your shoulder. Rest helps your muscles and tissues heal. Avoid activities that cause pain or use an overhead arm motion. Your healthcare provider will tell you when it is safe to return to sports or other daily activities.    When should I seek immediate care?   •Your shoulder and arm become pale or cold.  •You cannot move your shoulder and arm.  •You have more redness or swelling in your shoulder.  •Your shoulder becomes dislocated again.    When should I call my doctor?   •You have a fever.  •You have more pain in your shoulder and arm even after you rest and take your medicine.  •You have new weakness or numbness in your shoulder and arm.  •You have questions or concerns about your condition or care. FOLLOW UP WITH AN ORTHOPEDIC DOCTOR    Our Emergency Department Referral Coordinators will be reaching out to you in the next 24-48 hours from 9:00am to 5:00pm with a follow up appointment. Please expect a phone call from the hospital in that time frame. If you do not receive a call or if you have any questions or concerns, you can reach them at   [phone]      Shoulder Dislocation    WHAT YOU NEED TO KNOW:    What is a shoulder dislocation? A shoulder dislocation happens when the top of your arm bone (humerus) moves out of the socket in your shoulder blade. Shoulder dislocation can happen at any age but is not common in younger children.    What are the signs and symptoms of a shoulder dislocation?   •Shoulder and arm pain that worsens with movement  •A bump in front of or behind your shoulder  •Different shapes for each shoulder  •Redness and swelling in your shoulder  •Muscle spasms in your shoulder  •Weakness, numbness, or tingling in your shoulder and arm    Shoulder Sling  •Physical therapy may be used to teach you exercises to help improve movement and strength, and to decrease pain.  •Surgery may be needed to repair damaged tissues around your shoulder joint. You may also need a bone graft to repair your shoulder. A bone graft is artificial bone used to replace your damaged bone. You may also need surgery if your shoulder dislocates often.    What can I do to care for my shoulder?   •Apply ice to your shoulder. Ice helps decrease swelling and pain. Ice may also help prevent tissue damage. Use an ice pack, or put crushed ice in a plastic bag. Cover it with a towel before you place it on your shoulder. Apply ice for 15 to 20 minutes every hour or as directed.  •Rest your shoulder. Rest helps your muscles and tissues heal. Avoid activities that cause pain or use an overhead arm motion. Your healthcare provider will tell you when it is safe to return to sports or other daily activities.    When should I seek immediate care?   •Your shoulder and arm become pale or cold.  •You cannot move your shoulder and arm.  •You have more redness or swelling in your shoulder.  •Your shoulder becomes dislocated again.    When should I call my doctor?   •You have a fever.  •You have more pain in your shoulder and arm even after you rest and take your medicine.  •You have new weakness or numbness in your shoulder and arm.  •You have questions or concerns about your condition or care.

## 2024-08-11 NOTE — ED PROVIDER NOTE - PATIENT PORTAL LINK FT
Letter written for patient at appointment (virtual)     Please send letter to patient     There does not seem to be an option to release online   Darby Sandoval MD on 2/12/2024 at 9:28 PM     You can access the FollowMyHealth Patient Portal offered by [facility] by registering at the following website: [email or web address]. By joining [employer]’s FollowMyHealth portal, you will also be able to view your health information using other applications (apps) compatible with our system.

## 2024-08-11 NOTE — ED PROVIDER NOTE - CLINICAL SUMMARY MEDICAL DECISION MAKING FREE TEXT BOX
Patient presented status post fall onto the left shoulder prior to arrival.  Patient states that he thinks he dislocated the shoulder, stating that he has had this in the past.  On arrival, patient afebrile, hemodynamically stable, neurovascularly intact, but obvious deformity noted to the left shoulder.  X-ray obtained and confirmed anterior dislocation shoulder, no definitive fracture visualized.  Shoulder successfully reduced in the ED as confirmed by subsequent x-ray.  Patient placed in sling and will discharge home with outpatient orthopedic follow-up.  Patient agreeable with plan. Agrees to return to ED for any new or worsening symptoms.

## 2024-08-11 NOTE — ED PROVIDER NOTE - PHYSICAL EXAMINATION
As Follows:  CONST: Well appearing in NAD  EYES: PERRL, EOMI, Sclera and conjunctiva clear.   CARD: Normal rate and rhythm  RESP: No distress or accessory breathing  MS: Tender left shoulder with anterior deformity. Normal ROM in all other extremities. No midline Cervical/Thoracic/Lumbar spinal tenderness.  VASC: Radial pulse 2+. Cap refill < 2sec.   SKIN: Warm, dry, no acute rashes. MMM  NEURO: A&Ox3, No focal deficits. Strength and sensation intact. Steady gait

## 2024-08-11 NOTE — ED ADULT NURSE NOTE - CCCP TRG CHIEF CMPLNT
shoulder pain/injury Cyclophosphamide Pregnancy And Lactation Text: This medication is Pregnancy Category D and it isn't considered safe during pregnancy. This medication is excreted in breast milk.

## 2024-08-15 ENCOUNTER — APPOINTMENT (OUTPATIENT)
Dept: ORTHOPEDIC SURGERY | Facility: CLINIC | Age: 34
End: 2024-08-15
Payer: COMMERCIAL

## 2024-08-15 DIAGNOSIS — M25.312 OTHER INSTABILITY, LEFT SHOULDER: ICD-10-CM

## 2024-08-15 PROCEDURE — 99204 OFFICE O/P NEW MOD 45 MIN: CPT

## 2024-08-15 NOTE — HISTORY OF PRESENT ILLNESS
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 with for time dislocation of the left shoulder each time had to be relocated in the hospital last time was at [facility] reviewed films from the hospital and agree  Physical exam left shoulder is got apprehension normal sensation to light touch over the deltoid but some pain posteriorly with range of motion which is guarded today  Diagnosis is recurrent left shoulder instability  Since this is the fourth time he is dislocated recommend an MRI we will see him back in 2 weeks to discuss all options We did discuss surgery with him today in detail does have small children at home and advised to be careful to jump on that or maybe dislocate as well as sleeping at night and dislocate so advised him to wear shirt a safety pin the sling to the shirt Surgical Discussion (general)  The patient was advised of the diagnosis.  The natural history of the pathology was explained in full to the patient in layman's terms. All questions were answered.  The risks and benefits of surgical and non-surgical treatment alternatives were explained in full to the patient.   The patient demonstrated a full understanding of the surgical and non-surgical options.  The risks of surgery were outlined in full to the patient including but not limited to bleeding, scarring, infection, sepsis, neurologic injury, vascular injury, failure to resolve symptoms, symptom recurrence, the need for further surgery, non-healing, wound breakdown, deep vein thrombosis, pulmonary embolism, spontaneous osteonecrosis, anesthesia complications and even death.  The patient understood all the risks and accepted them and understood that other complications could occur that are not mentioned above.  The intraoperative plan, post-operative plan, post-operative expectations and limitations were explained in full.  Expectations from non-surgical treatment were explained in full as well.  The patient demonstrated a complete understanding of the treatment alternatives and requested the above-mentioned procedure.  This will be scheduled accordingly.

## 2024-09-05 ENCOUNTER — APPOINTMENT (OUTPATIENT)
Dept: ORTHOPEDIC SURGERY | Facility: CLINIC | Age: 34
End: 2024-09-05
Payer: COMMERCIAL

## 2024-09-05 DIAGNOSIS — M25.312 OTHER INSTABILITY, LEFT SHOULDER: ICD-10-CM

## 2024-09-05 PROCEDURE — 99214 OFFICE O/P EST MOD 30 MIN: CPT

## 2024-09-05 NOTE — HISTORY OF PRESENT ILLNESS
[de-identified] : Patient is here for evaluation of his left shoulder is about dislocated MRI did come back labral tear minimal bone contusion or deformity of the glenoid  He is got good range of motion good strength positive apprehension  I discussed that no treatment nonoperative and operative all risk and benefits discussed in detail including but not limited to dislocation loss range of motion DVT PE continued pain worsening pain revision surgery in light of the surgery done for the left shoulder anterior labral repair decompression debridement Surgical Discussion (general)  The patient was advised of the diagnosis.  The natural history of the pathology was explained in full to the patient in layman's terms. All questions were answered.  The risks and benefits of surgical and non-surgical treatment alternatives were explained in full to the patient.   The patient demonstrated a full understanding of the surgical and non-surgical options.  The risks of surgery were outlined in full to the patient including but not limited to bleeding, scarring, infection, sepsis, neurologic injury, vascular injury, failure to resolve symptoms, symptom recurrence, the need for further surgery, non-healing, wound breakdown, deep vein thrombosis, pulmonary embolism, spontaneous osteonecrosis, anesthesia complications and even death.  The patient understood all the risks and accepted them and understood that other complications could occur that are not mentioned above.  The intraoperative plan, post-operative plan, post-operative expectations and limitations were explained in full.  Expectations from non-surgical treatment were explained in full as well.  The patient demonstrated a complete understanding of the treatment alternatives and requested the above-mentioned procedure.  This will be scheduled accordingly.

## 2024-09-20 ENCOUNTER — APPOINTMENT (OUTPATIENT)
Dept: ORTHOPEDIC SURGERY | Facility: AMBULATORY SURGERY CENTER | Age: 34
End: 2024-09-20

## 2024-09-20 PROCEDURE — 29999 UNLISTED PX ARTHROSCOPY: CPT | Mod: LT

## 2024-09-20 PROCEDURE — 29807 SHO ARTHRS SRG RPR SLAP LES: CPT | Mod: LT

## 2024-09-20 RX ORDER — OXYCODONE AND ACETAMINOPHEN 5; 325 MG/1; MG/1
5-325 TABLET ORAL
Qty: 20 | Refills: 0 | Status: ACTIVE | COMMUNITY
Start: 2024-09-20 | End: 1900-01-01

## 2024-09-20 RX ORDER — MELOXICAM 15 MG/1
15 TABLET ORAL DAILY
Qty: 30 | Refills: 1 | Status: ACTIVE | COMMUNITY
Start: 2024-09-20 | End: 1900-01-01

## 2024-09-20 NOTE — PROCEDURE
[FreeTextEntry3] : surg msi lt shoulder ant cap slap remlisage [id number] keep sling 6 weeks ,only elbow rom

## 2024-09-25 ENCOUNTER — APPOINTMENT (OUTPATIENT)
Dept: ORTHOPEDIC SURGERY | Facility: CLINIC | Age: 34
End: 2024-09-25
Payer: COMMERCIAL

## 2024-09-25 ENCOUNTER — TRANSCRIPTION ENCOUNTER (OUTPATIENT)
Age: 34
End: 2024-09-25

## 2024-09-25 DIAGNOSIS — M25.312 OTHER INSTABILITY, LEFT SHOULDER: ICD-10-CM

## 2024-09-25 PROCEDURE — 99024 POSTOP FOLLOW-UP VISIT: CPT

## 2024-09-25 NOTE — HISTORY OF PRESENT ILLNESS
[de-identified] : Patient is here 1 week from his shoulder stability surgery and anterior labral repair as well as remplissage a 20 pendulum exercises she will also work on elbow range of motion we will see him back in 6 weeks to start formal therapy/well-healed sutures removed and Steri-Stripped the wound

## 2024-11-06 ENCOUNTER — APPOINTMENT (OUTPATIENT)
Dept: ORTHOPEDIC SURGERY | Facility: CLINIC | Age: 34
End: 2024-11-06
Payer: COMMERCIAL

## 2024-11-06 DIAGNOSIS — M25.312 OTHER INSTABILITY, LEFT SHOULDER: ICD-10-CM

## 2024-11-06 PROCEDURE — 99024 POSTOP FOLLOW-UP VISIT: CPT

## 2025-01-28 ENCOUNTER — APPOINTMENT (OUTPATIENT)
Facility: CLINIC | Age: 35
End: 2025-01-28

## 2025-05-03 NOTE — ED PROCEDURE NOTE - ATTENDING CONTRIBUTION TO CARE
calm I was physically present for and directly supervised this procedure.  Procedure was done as documented without any complications.

## 2025-05-28 NOTE — ED PROVIDER NOTE - WR ORDER STATUS 1
Problem: PAIN - ADULT  Goal: Verbalizes/displays adequate comfort level or baseline comfort level  Description: Interventions:  - Encourage patient to monitor pain and request assistance  - Assess pain using appropriate pain scale  - Administer analgesics as ordered based on type and severity of pain and evaluate response  - Implement non-pharmacological measures as appropriate and evaluate response  - Consider cultural and social influences on pain and pain management  - Notify physician/advanced practitioner if interventions unsuccessful or patient reports new pain  - Educate patient/family on pain management process including their role and importance of  reporting pain   - Provide non-pharmacologic/complimentary pain relief interventions  Outcome: Progressing     Problem: NEUROSENSORY - ADULT  Goal: Achieves stable or improved neurological status  Description: INTERVENTIONS  - Monitor and report changes in neurological status  - Monitor vital signs such as temperature, blood pressure and any other labs ordered       Outcome: Progressing      Performed